# Patient Record
Sex: FEMALE | Race: WHITE | NOT HISPANIC OR LATINO
[De-identification: names, ages, dates, MRNs, and addresses within clinical notes are randomized per-mention and may not be internally consistent; named-entity substitution may affect disease eponyms.]

---

## 2019-11-05 ENCOUNTER — FORM ENCOUNTER (OUTPATIENT)
Age: 64
End: 2019-11-05

## 2020-01-21 ENCOUNTER — HOSPITAL ENCOUNTER (OUTPATIENT)
Dept: HOSPITAL 74 - JER | Age: 65
Setting detail: OBSERVATION
LOS: 1 days | Discharge: LEFT BEFORE DISCHARGE | End: 2020-01-22
Attending: FAMILY MEDICINE | Admitting: INTERNAL MEDICINE
Payer: COMMERCIAL

## 2020-01-21 VITALS — BODY MASS INDEX: 41.5 KG/M2

## 2020-01-21 DIAGNOSIS — I10: ICD-10-CM

## 2020-01-21 DIAGNOSIS — J44.9: ICD-10-CM

## 2020-01-21 DIAGNOSIS — E11.9: ICD-10-CM

## 2020-01-21 DIAGNOSIS — R55: Primary | ICD-10-CM

## 2020-01-21 DIAGNOSIS — R51: ICD-10-CM

## 2020-01-21 DIAGNOSIS — G47.33: ICD-10-CM

## 2020-01-21 LAB
ALBUMIN SERPL-MCNC: 3.8 G/DL (ref 3.4–5)
ALP SERPL-CCNC: 92 U/L (ref 45–117)
ALT SERPL-CCNC: 36 U/L (ref 13–61)
ANION GAP SERPL CALC-SCNC: 9 MMOL/L (ref 8–16)
APTT BLD: 28.3 SECONDS (ref 25.2–36.5)
AST SERPL-CCNC: 38 U/L (ref 15–37)
BASOPHILS # BLD: 0.6 % (ref 0–2)
BILIRUB SERPL-MCNC: 0.5 MG/DL (ref 0.2–1)
BUN SERPL-MCNC: 14.2 MG/DL (ref 7–18)
CALCIUM SERPL-MCNC: 9.7 MG/DL (ref 8.5–10.1)
CHLORIDE SERPL-SCNC: 101 MMOL/L (ref 98–107)
CO2 SERPL-SCNC: 26 MMOL/L (ref 21–32)
CREAT SERPL-MCNC: 0.7 MG/DL (ref 0.55–1.3)
DEPRECATED RDW RBC AUTO: 13.9 % (ref 11.6–15.6)
EOSINOPHIL # BLD: 0.4 % (ref 0–4.5)
GLUCOSE SERPL-MCNC: 193 MG/DL (ref 74–106)
HCT VFR BLD CALC: 42.6 % (ref 32.4–45.2)
HGB BLD-MCNC: 14.9 GM/DL (ref 10.7–15.3)
INR BLD: 0.93 (ref 0.83–1.09)
LYMPHOCYTES # BLD: 15.5 % (ref 8–40)
MCH RBC QN AUTO: 29.6 PG (ref 25.7–33.7)
MCHC RBC AUTO-ENTMCNC: 35.1 G/DL (ref 32–36)
MCV RBC: 84.3 FL (ref 80–96)
MONOCYTES # BLD AUTO: 5.8 % (ref 3.8–10.2)
NEUTROPHILS # BLD: 77.7 % (ref 42.8–82.8)
PLATELET # BLD AUTO: 244 K/MM3 (ref 134–434)
PMV BLD: 8.6 FL (ref 7.5–11.1)
POTASSIUM SERPLBLD-SCNC: 4.6 MMOL/L (ref 3.5–5.1)
PROT SERPL-MCNC: 7.5 G/DL (ref 6.4–8.2)
PT PNL PPP: 11 SEC (ref 9.7–13)
RBC # BLD AUTO: 5.05 M/MM3 (ref 3.6–5.2)
SODIUM SERPL-SCNC: 136 MMOL/L (ref 136–145)
WBC # BLD AUTO: 5.9 K/MM3 (ref 4–10)

## 2020-01-21 PROCEDURE — 3E033GC INTRODUCTION OF OTHER THERAPEUTIC SUBSTANCE INTO PERIPHERAL VEIN, PERCUTANEOUS APPROACH: ICD-10-PCS | Performed by: FAMILY MEDICINE

## 2020-01-21 PROCEDURE — 3E033NZ INTRODUCTION OF ANALGESICS, HYPNOTICS, SEDATIVES INTO PERIPHERAL VEIN, PERCUTANEOUS APPROACH: ICD-10-PCS | Performed by: FAMILY MEDICINE

## 2020-01-21 PROCEDURE — G0378 HOSPITAL OBSERVATION PER HR: HCPCS

## 2020-01-21 NOTE — PDOC
History of Present Illness





- General


Chief Complaint: Lightheaded


Stated Complaint: HBP/NAUSEA


Time Seen by Provider: 01/21/20 20:04


History Source: Patient


Exam Limitations: No Limitations





- History of Present Illness


Initial Comments: 





01/21/20 21:10


65 yo female pmh HTN, COPD and DM presents to the ED with SPARROW, worst of her life

, that woke her up this am with associated dizziness and N/V. Pt notes her son 

pasted away from a stroke at 34 years ol and noted bleeding problems (no known 

bleeding disorder diagnoses however, pt states if she takes ASA or NSAIDs she 

bleeds). Pt states she has had HA in the past but this one is much different 

due to persistent severity, N/V and dizziness. Pt states her BP at home was 

over 190 systolic. Denies recent illness, travel, weakness or sensory deficits 

on 1 side, confusion, CP, SOB, abdominal pain back pain, changes in bowel or 

bladder habits.





Past History





- Past Medical History


Allergies/Adverse Reactions: 


 Allergies











Allergy/AdvReac Type Severity Reaction Status Date / Time


 


pregabalin [From Lyrica] Allergy Intermediate Rash Verified 01/21/20 17:52


 


hydrochlorothiazide Allergy  Rash Verified 01/21/20 17:52


 


valsartan [From Diovan] Allergy  NEURO Verified 01/21/20 17:52





   CHANGES  











Home Medications: 


Ambulatory Orders





Losartan Potassium [Cozaar] 100 mg PO DAILY 02/24/15 


metFORMIN HCL [Glucophage] 1,000 mg PO BID 02/24/15 


Metoprolol Tartrate 75 mg PO BID 01/21/20 








COPD: No


Diabetes: Yes


HTN: Yes





- Surgical History


Abdominal Surgery: Yes (bowel obstruction)


Appendectomy: Yes





- Psycho Social/Smoking Cessation Hx


Smoking Status: No


Smoking History: Never smoked


Years of Tobacco Use: 0


Have you smoked in the past 12 months: No


Number of Cigarettes Smoked Daily: 0


Cigars Per Day: 0


Hx Alcohol Use: No


Drug/Substance Use Hx: No


Substance Use Type: None


Hx Substance Use Treatment: No





**Review of Systems





- Review of Systems


Constitutional: Yes: See HPI


HEENTM: Yes: See HPI


Respiratory: Yes: See HPI


Cardiac (ROS): Yes: See HPI


ABD/GI: Yes: See HPI


: Yes: See HPI


Musculoskeletal: Yes: See HPI


Integumentary: Yes: See HPI


Neurological: Yes: See HPI





*Physical Exam





- Vital Signs


 Last Vital Signs











Temp Pulse Resp BP Pulse Ox


 


 98 F   83   18   161/72   98 


 


 01/21/20 17:46  01/21/20 17:46  01/21/20 17:46  01/21/20 17:46  01/21/20 17:46














- Physical Exam


General Appearance: Yes: Nourished, Appropriately Dressed.  No: Apparent 

Distress


HEENT: positive: EOMI, [place], Normal Voice


Neck: positive: Supple.  negative: Carotid bruit, Rigidity, Tender midline


Respiratory/Chest: positive: Lungs Clear, Normal Breath Sounds.  negative: 

Respiratory Distress, Accessory Muscle Use, Rapid RR, Crackles, Rales, Rhonchi, 

Stridor, Wheezing


Cardiovascular: positive: Regular Rhythm, Regular Rate, S1, S2.  negative: Edema

, JVD, Murmur


Vascular Pulses: Dorsalis-Pedis (R): 3+, Doralis-Pedis (L): 3+


Gastrointestinal/Abdominal: positive: Flat, Soft.  negative: Pulsatile Mass, 

Distended, Guarding, Rebound, Tenderness


Musculoskeletal: negative: CVA Tenderness


Extremity: positive: Normal Capillary Refill, Normal Inspection, Normal Range 

of Motion


Integumentary: positive: Normal Color, Dry, Warm


Neurologic: positive: CNs II-XII NML intact, Fully Oriented, Alert, Normal Mood/

Affect, Normal Response, Motor Strength 5/5.  negative: Facial Droop, Numbness, 

Sensory Deficit, Finger to Nose (normal), Confused, Disoriented





ED Treatment Course





- LABORATORY


CBC & Chemistry Diagram: 


 01/22/20 06:15





 01/22/20 06:15





- RADIOLOGY


Radiology Studies Ordered: 














 Category Date Time Status


 


 HEAD CT WITHOUT CONTRAST [CT] Stat CT Scan  01/21/20 21:03 Ordered


 


 CHEST X-RAY PORTABLE* [RAD] Stat Radiology  01/21/20 21:02 Ordered














Medical Decision Making





- Medical Decision Making





01/21/20 21:14


65 yo female pmh HTN, COPD and DM presents to the ED with SPARROW, worst of her life

, that woke her up this am with associated dizziness and N/V. Pt notes her son 

pasted away from a stroke at 34 years ol and noted bleeding problems (no known 

bleeding disorder diagnoses however, pt states if she takes ASA or NSAIDs she 

bleeds). Pt states she has had HA in the past but this one is much different 

due to persistent severity, N/V and dizziness. Pt states her BP at home was 

over 190 systolic. Denies recent illness, travel, weakness or sensory deficits 

on 1 side, confusion, CP, SOB, abdominal pain back pain, changes in bowel or 

bladder habits.











pt hpi concerning for SAH or posterior bleed. 





Head Ct done immediately after evaluation, negative for acute bleed.





Discussed LP with pt, shared decision making, states due to bleeding hx no LP 

at this time. WIll do CTA head and reassess 





s/o to night team





Discharge





- Discharge Information


Problems reviewed: Yes


Clinical Impression/Diagnosis: 


 Lightheadedness, Headache





Condition: Guarded


Disposition: AGAINST MEDICAL ADVICE





- Follow up/Referral





- Patient Discharge Instructions





- Post Discharge Activity

## 2020-01-21 NOTE — PDOC
Attending Attestation





- Resident


Resident Name: Jude Garcia





- ED Attending Attestation


I have performed the following: I have examined & evaluated the patient, The 

case was reviewed & discussed with the resident, I agree w/resident's findings 

& plan





- HPI


HPI: 





01/22/20 00:35


see resident hpi





- Physicial Exam


PE: 





01/22/20 00:35


agree with resident exam





- Medical Decision Making





01/22/20 00:35


64-year-old female complaining of the worst headache of her life and 

lightheadedness


CT scan of the brain showed no acute bleed


Patient states she has a tendency to bleed easily, worsened when taking NSAIDs


Discussion of lumbar puncture at the bedside, patient states that she does not 

want the procedure and is aware that there is a small chance of undiagnosed 

bleeding


CTA of the brain showed no structural abnormality


We will admit to medical service for observation further evaluation and 

neurology consult

## 2020-01-22 VITALS — HEART RATE: 93 BPM | SYSTOLIC BLOOD PRESSURE: 161 MMHG | DIASTOLIC BLOOD PRESSURE: 70 MMHG

## 2020-01-22 VITALS — TEMPERATURE: 98.2 F

## 2020-01-22 LAB
ANION GAP SERPL CALC-SCNC: 7 MMOL/L (ref 8–16)
BASOPHILS # BLD: 0.7 % (ref 0–2)
BUN SERPL-MCNC: 17.4 MG/DL (ref 7–18)
CALCIUM SERPL-MCNC: 9.1 MG/DL (ref 8.5–10.1)
CHLORIDE SERPL-SCNC: 102 MMOL/L (ref 98–107)
CO2 SERPL-SCNC: 30 MMOL/L (ref 21–32)
CREAT SERPL-MCNC: 0.7 MG/DL (ref 0.55–1.3)
DEPRECATED RDW RBC AUTO: 13.5 % (ref 11.6–15.6)
EOSINOPHIL # BLD: 0.6 % (ref 0–4.5)
GLUCOSE SERPL-MCNC: 147 MG/DL (ref 74–106)
HCT VFR BLD CALC: 41 % (ref 32.4–45.2)
HGB BLD-MCNC: 14 GM/DL (ref 10.7–15.3)
LYMPHOCYTES # BLD: 30.5 % (ref 8–40)
MCH RBC QN AUTO: 29 PG (ref 25.7–33.7)
MCHC RBC AUTO-ENTMCNC: 34.2 G/DL (ref 32–36)
MCV RBC: 84.8 FL (ref 80–96)
MONOCYTES # BLD AUTO: 14 % (ref 3.8–10.2)
NEUTROPHILS # BLD: 54.2 % (ref 42.8–82.8)
PLATELET # BLD AUTO: 218 K/MM3 (ref 134–434)
PMV BLD: 8.1 FL (ref 7.5–11.1)
POTASSIUM SERPLBLD-SCNC: 3.4 MMOL/L (ref 3.5–5.1)
RBC # BLD AUTO: 4.83 M/MM3 (ref 3.6–5.2)
SODIUM SERPL-SCNC: 139 MMOL/L (ref 136–145)
WBC # BLD AUTO: 5.2 K/MM3 (ref 4–10)

## 2020-01-22 RX ADMIN — INSULIN ASPART SCH: 100 INJECTION, SOLUTION INTRAVENOUS; SUBCUTANEOUS at 22:41

## 2020-01-22 RX ADMIN — INSULIN ASPART SCH: 100 INJECTION, SOLUTION INTRAVENOUS; SUBCUTANEOUS at 18:25

## 2020-01-22 RX ADMIN — INSULIN ASPART SCH: 100 INJECTION, SOLUTION INTRAVENOUS; SUBCUTANEOUS at 11:31

## 2020-01-22 RX ADMIN — METOPROLOL TARTRATE SCH: 50 TABLET, FILM COATED ORAL at 22:41

## 2020-01-22 RX ADMIN — METOPROLOL TARTRATE SCH MG: 50 TABLET, FILM COATED ORAL at 09:33

## 2020-01-22 NOTE — CONSULT
Consult - text type





- Consultation


Consultation Note: 





 Neurology





- Primary Care Physician


PCP: Kobe Goodwin





- Admission


Chief Complaint: Headache, Dizziness


History Source: Patient


Limitations to Obtaining History: No Limitations





- History of Present Illness


65 yo female pmh HTN, COPD and DM presented to the ED with SPARROW, worst of her life

, that woke her up with associated dizziness and N/V. Pt noted her son passed 

away from a stroke at 34 years old and noted bleeding problems (no known 

bleeding disorder diagnoses however, pt stated if she takes ASA or NSAIDs she 

bleeds). Pt stated she has had HA in the past but this one was much different 

due to persistent severity, N/V and dizziness. Pt stated her BP at home was 

over 190 systolic. Denied recent illness, travel, weakness or sensory deficits 

on 1 side, confusion, CP, SOB, abdominal pain back pain, changes in bowel or 

bladder habits. Ct of head performed and demonstrated moderate volume loss 

without evidence of acute intracranial pathology. CTA of Head completed and 

awaiting official report. There was concern regarding possible subarachnoid 

hemorrhage which was the reason for having the  CTA.  However, her headaches 

significantly improved and is essentially resolved this morning.  Unlikely to 

have subarachnoid hemorrhage at this point, lumbar puncture does not seem 

necessary. Can continue evaluation for presyncopal sensations and cardiac 

workup.





- Past Medical History


Cardiovascular: Yes: HTN


Pulmonary: Yes: COPD


Gastrointestinal: Yes: Other (SBO)


Endocrine: Yes: Diabetes Mellitus





- Past Surgical History


Past Surgical History: Yes: Appendectomy, Hysterectomy





- Smoking History


Smoking history: Never smoked


Have you smoked in the past 12 months: No


Aproximately how many cigarettes per day: 0





- Alcohol/Substance Use


Hx Alcohol Use: No


History of Substance Use: reports: None





- Social History


Usual Living Arrangement: Yes: Other (family)


ADL: Independent


History of Recent Travel: No





Family Medical History


Family History: HTN





Review of Systems





- Review of Systems


Constitutional: reports: No Symptoms


Eyes: reports: No Symptoms


HENT: reports: No Symptoms


Neck: reports: No Symptoms


Cardiovascular: reports: No Symptoms


Respiratory: reports: No Symptoms


Gastrointestinal: reports: No Symptoms


Genitourinary: reports: No Symptoms


Breasts: reports: No Symptoms Reported


Musculoskeletal: reports: No Symptoms


Integumentary: reports: No Symptoms


Neurological: reports: Dizziness, Headache, Syncope (Pre)


Endocrine: reports: No Symptoms


Hematology/Lymphatic: reports: No Symptoms


Psychiatric: reports: No Symptoms


Pain Intensity: 4





Home Medications





- Allergies


Allergies/Adverse Reactions: 


 Allergies











Allergy/AdvReac Type Severity Reaction Status Date / Time


 


pregabalin [From Lyrica] Allergy Intermediate Rash Verified 01/21/20 17:52


 


hydrochlorothiazide Allergy  Rash Verified 01/21/20 17:52


 


valsartan [From Diovan] Allergy  NEURO Verified 01/21/20 17:52





   CHANGES  














- Home Medications


Home Medications: 


Ambulatory Orders





Losartan Potassium [Cozaar] 100 mg PO DAILY 02/24/15 


metFORMIN HCL [Glucophage] 1,000 mg PO BID 02/24/15 


Metoprolol Tartrate 75 mg PO BID 01/21/20 





Active Medications





Acetaminophen (Tylenol -)  650 mg PO Q6H PRN


   PRN Reason: PAIN LEVEL 6-10


Losartan Potassium (Cozaar -)  100 mg PO DAILY [facility]


Metoprolol Tartrate 50 mg/ (Metoprolol Tartrate 25 mg)  75 mg PO BID [facility]











Physical Examination


Vital Signs: 


 Vital Signs











 Period  Temp  Pulse  Resp  BP Sys/Huitron  Pulse Ox


 


 Last 24 Hr  98 F-98.8 F  69-83  15-18  129-162/63-90  95-98











Constitutional: Yes: Well Nourished, No Distress, Calm, Obese


Eyes: Yes: WNL, Conjunctiva Clear, EOM Intact, PERRL


HENT: Yes: WNL, Atraumatic, Normocephalic


Neck: Yes: WNL, Supple, Trachea Midline


Cardiovascular: Yes: WNL, Regular Rate and Rhythm, S1, S2


Respiratory: Yes: Diminished


Gastrointestinal: Yes: WNL, Normal Bowel Sounds, Soft, Abdomen, Obese


...Rectal Exam: Yes: Deferred


Renal/: Yes: WNL


Breast(s): Yes: WNL


Musculoskeletal: Yes: WNL


Extremities: Yes: WNL


Edema: Yes


Edema: LLE: Trace, RLE: Trace


Peripheral Pulses WNL: Yes


Integumentary: Yes: WNL


Neurological: Yes: WNL, Alert, Oriented, Cran Nerves II-XII Intact


...Motor Strength: WNL


Psychiatric: Yes: WNL, Alert, Oriented





Labs: 


 CBCD











WBC  5.2 K/mm3 (4.0-10.0)   01/22/20  06:15    


 


RBC  4.83 M/mm3 (3.60-5.2)   01/22/20  06:15    


 


Hgb  14.0 GM/dL (10.7-15.3)   01/22/20  06:15    


 


Hct  41.0 % (32.4-45.2)   01/22/20  06:15    


 


MCV  84.8 fl (80-96)   01/22/20  06:15    


 


MCHC  34.2 g/dl (32.0-36.0)   01/22/20  06:15    


 


RDW  13.5 % (11.6-15.6)   01/22/20  06:15    


 


Plt Count  218 K/MM3 (134-434)   01/22/20  06:15    


 


MPV  8.1 fl (7.5-11.1)   01/22/20  06:15    








 CMP











Sodium  139 mmol/L (136-145)   01/22/20  06:15    


 


Potassium  3.4 mmol/L (3.5-5.1)  L  01/22/20  06:15    


 


Chloride  102 mmol/L ()   01/22/20  06:15    


 


Carbon Dioxide  30 mmol/L (21-32)   01/22/20  06:15    


 


Anion Gap  7 MMOL/L (8-16)  L  01/22/20  06:15    


 


BUN  17.4 mg/dL (7-18)   01/22/20  06:15    


 


Creatinine  0.7 mg/dL (0.55-1.3)   01/22/20  06:15    


 


Random Glucose  147 mg/dL ()  H  01/22/20  06:15    


 


Calcium  9.1 mg/dL (8.5-10.1)   01/22/20  06:15    


 


Total Bilirubin  0.5 mg/dL (0.2-1)   01/21/20  20:20    


 


AST  38 U/L (15-37)  H  01/21/20  20:20    


 


ALT  36 U/L (13-61)   01/21/20  20:20    


 


Alkaline Phosphatase  92 U/L ()   01/21/20  20:20    


 


Total Protein  7.5 g/dl (6.4-8.2)   01/21/20  20:20    


 


Albumin  3.8 g/dl (3.4-5.0)   01/21/20  20:20    








 CARDIAC ENZYMES











Creatine Kinase  121 U/L ()   01/21/20  20:20    


 


Troponin I  < 0.02 ng/ml (0.00-0.05)   01/21/20  20:20    











Assessment/Plan


65 yo female pmh HTN, COPD and DM presented to the ED with SPARROW, worst of her life

, that woke her up with associated dizziness and N/V. Pt noted her son passed 

away from a stroke at 34 years old and noted bleeding problems (no known 

bleeding disorder diagnoses however, pt stated if she takes ASA or NSAIDs she 

bleeds). Pt stated she has had HA in the past but this one was much different 

due to persistent severity, N/V and dizziness. Pt stated her BP at home was 

over 190 systolic. Denied recent illness, travel, weakness or sensory deficits 

on 1 side, confusion, CP, SOB, abdominal pain back pain, changes in bowel or 

bladder habits. Ct of head performed and demonstrated moderate volume loss 

without evidence of acute intracranial pathology. CTA of Head completed and 

awaiting official report. There was concern regarding possible subarachnoid 

hemorrhage which was the reason for having the  CTA.  However, her headaches 

significantly improved and is essentially resolved this morning.  Unlikely to 

have subarachnoid hemorrhage at this point, lumbar puncture does not seem 

necessary. Can continue evaluation for presyncopal sensations and cardiac 

workup. Monitor blood pressure, maintain less than 140/90. Monitor glucose, 

maintain euglycemic range.  Recommend continued hydration and  cognitive rest. 

  Follow-up CTA results/report. Can take Tylenol or Fioricet for headache if 

needed. Not more than 3X per day or 3 days per week, which can precipitate 

rebound and medication overuse headaches.

## 2020-01-22 NOTE — HP
Admitting History and Physical





- Primary Care Physician


PCP: Kobe Goodwin





- Admission


Chief Complaint: Headache, Dizziness


History of Present Illness: 





This is a 63 y/o woman with a PMHx of HTN, DM, COPD, MATA. Who presents to the 

ED with dizziness and headache x today. Patient describes the headache as sharp/

severe with nausea. Patient reports increased dizziness with movement. Patient 

denies recent fall or trauma. Patient denies fever, chills, SOB, CP, 

palpitations, AP, V/D, constipation, dysuria. Patient reports familial hx CVA.





ED Course was noted for:





(1) Head CT- moderate volume/loss, no evidence of acute intracranial pathology. 

moderate deviation of nasal septum towards the right


(2) Head CTA- widely patent and normal caliber arteries in the anterior and 

posterior circulation with no evidence of focal aneursym


(3) EKG- NSR RBBB, T wave abnormality








History Source: Patient


Limitations to Obtaining History: No Limitations





- Past Medical History


Cardiovascular: Yes: HTN


Pulmonary: Yes: COPD


Gastrointestinal: Yes: Other (SBO)


Endocrine: Yes: Diabetes Mellitus





- Past Surgical History


Past Surgical History: Yes: Appendectomy, Hysterectomy





- Smoking History


Smoking history: Never smoked


Have you smoked in the past 12 months: No


Aproximately how many cigarettes per day: 0





- Alcohol/Substance Use


Hx Alcohol Use: No


History of Substance Use: reports: None





- Social History


Usual Living Arrangement: Yes: Other (family)


ADL: Independent


History of Recent Travel: No





Home Medications





- Allergies


Allergies/Adverse Reactions: 


 Allergies











Allergy/AdvReac Type Severity Reaction Status Date / Time


 


pregabalin [From Lyrica] Allergy Intermediate Rash Verified 20 17:52


 


hydrochlorothiazide Allergy  Rash Verified 20 17:52


 


valsartan [From Diovan] Allergy  NEURO Verified 20 17:52





   CHANGES  














- Home Medications


Home Medications: 


Ambulatory Orders





Losartan Potassium [Cozaar] 100 mg PO DAILY 02/24/15 


metFORMIN HCL [Glucophage] 1,000 mg PO BID 02/24/15 


Metoprolol Tartrate 75 mg PO BID 20 











Family Medical History


Family Hx Nuerologic Problems: Son (CVA-  age in 30's)





Review of Systems





- Review of Systems


Constitutional: reports: No Symptoms


Eyes: reports: No Symptoms


HENT: reports: No Symptoms


Neck: reports: No Symptoms


Cardiovascular: reports: No Symptoms


Respiratory: reports: No Symptoms


Gastrointestinal: reports: Nausea


Genitourinary: reports: No Symptoms


Breasts: reports: No Symptoms Reported


Musculoskeletal: reports: No Symptoms


Integumentary: reports: No Symptoms


Neurological: reports: Dizziness, Headache, Syncope (Pre)


Endocrine: reports: No Symptoms


Hematology/Lymphatic: reports: No Symptoms


Psychiatric: reports: No Symptoms


Pain Intensity: 4





Physical Examination


Vital Signs: 


 Vital Signs











Temperature  98.2 F   20 21:52


 


Pulse Rate  81   20 21:52


 


Respiratory Rate  15   20 21:52


 


Blood Pressure  162/90   20 21:52


 


O2 Sat by Pulse Oximetry (%)  97   20 21:52











Constitutional: Yes: Well Nourished, No Distress, Calm, Obese


Eyes: Yes: WNL, Conjunctiva Clear, EOM Intact, PERRL


HENT: Yes: WNL, Atraumatic, Normocephalic


Neck: Yes: WNL, Supple, Trachea Midline


Cardiovascular: Yes: WNL, Regular Rate and Rhythm, S1, S2


Respiratory: Yes: Diminished


Gastrointestinal: Yes: WNL, Normal Bowel Sounds, Soft, Abdomen, Obese


...Rectal Exam: Yes: Deferred


Renal/: Yes: WNL


Breast(s): Yes: WNL


Musculoskeletal: Yes: WNL


Extremities: Yes: WNL


Edema: Yes


Edema: LLE: Trace, RLE: Trace


Peripheral Pulses WNL: Yes


Integumentary: Yes: WNL


Neurological: Yes: WNL, Alert, Oriented, Cran Nerves II-XII Intact


...Motor Strength: WNL


Psychiatric: Yes: WNL, Alert, Oriented


Labs: 


 CBC, BMP





 20 20:20 





 20 20:20 





 Laboratory Results - last 24 hr











  20





  20:20 20:20 20:20


 


WBC    5.9


 


RBC    5.05


 


Hgb    14.9


 


Hct    42.6


 


MCV    84.3


 


MCH    29.6


 


MCHC    35.1


 


RDW    13.9


 


Plt Count    244


 


MPV    8.6


 


Absolute Neuts (auto)    4.6


 


Neutrophils %    77.7  D


 


Lymphocytes %    15.5  D


 


Monocytes %    5.8


 


Eosinophils %    0.4


 


Basophils %    0.6


 


Nucleated RBC %    0


 


PT with INR  11.00  


 


INR  0.93  


 


PTT (Actin FS)  28.3  


 


Sodium   


 


Potassium   


 


Chloride   


 


Carbon Dioxide   


 


Anion Gap   


 


BUN   


 


Creatinine   


 


Est GFR (CKD-EPI)AfAm   


 


Est GFR (CKD-EPI)NonAf   


 


Random Glucose   


 


Calcium   


 


Total Bilirubin   


 


AST   


 


ALT   


 


Alkaline Phosphatase   


 


Creatine Kinase   121 


 


Troponin I   < 0.02 


 


Total Protein   


 


Albumin   














  20





  20:20


 


WBC 


 


RBC 


 


Hgb 


 


Hct 


 


MCV 


 


MCH 


 


MCHC 


 


RDW 


 


Plt Count 


 


MPV 


 


Absolute Neuts (auto) 


 


Neutrophils % 


 


Lymphocytes % 


 


Monocytes % 


 


Eosinophils % 


 


Basophils % 


 


Nucleated RBC % 


 


PT with INR 


 


INR 


 


PTT (Actin FS) 


 


Sodium  136


 


Potassium  4.6


 


Chloride  101


 


Carbon Dioxide  26


 


Anion Gap  9


 


BUN  14.2


 


Creatinine  0.7


 


Est GFR (CKD-EPI)AfAm  106.87


 


Est GFR (CKD-EPI)NonAf  92.21


 


Random Glucose  193 H


 


Calcium  9.7


 


Total Bilirubin  0.5


 


AST  38 H


 


ALT  36


 


Alkaline Phosphatase  92


 


Creatine Kinase 


 


Troponin I 


 


Total Protein  7.5


 


Albumin  3.8








 Intake & Output











 20





 23:59 23:59 23:59 23:59


 


Weight   113.398 kg 








 Current Medications











Generic Name Dose Route Start Last Admin





  Trade Name Freq  PRN Reason Stop Dose Admin


 


Losartan Potassium  100 mg  20 10:00  





  Cozaar -  PO   





  DAILY RAJ   





     





     





     





     


 


Metoprolol Tartrate  75 mg  20 10:00  





  Lopressor -  PO   





  BID RAJ   





     





     





     





     














Imaging





- Results


Chest X-ray: Report Reviewed, Image Reviewed


Cat Scan: Report Reviewed, Image Reviewed


EKG: Image Reviewed





Problem List





- Problems


(1) Pre-syncope


Assessment/Plan: 


r/o Stroke vs Arrhythmia


Continue cardiac monitoring


Head CT- moderate volume/loss, no evidence of acute intracranial pathology


Head CTA- widely patent and normal caliber arteries in the anterior and 

posterior circulation with no evidence of focal aneursym


Appreciate Neurology consult


Neurochecks


Orthostatics


Fall Precautions


Monitor CBC, BMP





Code(s): R55 - SYNCOPE AND COLLAPSE   





(2) Headache


Assessment/Plan: 


See above


Tylenol prn





Code(s): R51 - HEADACHE   





(3) COPD (chronic obstructive pulmonary disease)


Assessment/Plan: 


stable


No acute flair


Duonebs prn


O2, prn








Code(s): J44.9 - CHRONIC OBSTRUCTIVE PULMONARY DISEASE, UNSPECIFIED   





(4) HTN (hypertension)


Assessment/Plan: 


stable


Monitor BP


Continue home meds


Monitor renal function


Code(s): I10 - ESSENTIAL (PRIMARY) HYPERTENSION   





(5) Diabetes mellitus


Assessment/Plan: 


stable


BGMs


Hold Metformin secondary to CTA


ISS


Code(s): E11.9 - TYPE 2 DIABETES MELLITUS WITHOUT COMPLICATIONS   





Assessment/Plan





This is a 63 y/o woman with a PMHx of HTN, DM, COPD, MATA (on CPAP). Placed in 

Telemetry Observation for Presyncope, Headache for further evaluation of their 

emergent condition.





Plan:


See Problem List





FEN


PO fluids as tolerated


Replete lytes prn


Low Na, Diabetic Diet





DVT ppx


OOB


SCDs


Consider AC if LOS> 48hrs





Dispo: Observation





Visit type





- Emergency Visit


Emergency Visit: Yes


ED Registration Date: 20


Care time: The patient presented to the Emergency Department on the above date 

and was hospitalized for further evaluation of their emergent condition.





- New Patient


This patient is new to me today: Yes


Date on this admission: 20





- Critical Care


Critical Care patient: No

## 2020-01-22 NOTE — CON.PULM
Consult


Consult Specialty:: PULMONARY


Referred by:: Dr Ogden


Reason for Consultation:: COPD





- History of Present Illness


Chief Complaint: headache


History of Present Illness: 





63yo female with h/o HTN, DM, COPD who was admitted with dizziness and 

headache. Found to have elevated blood pressures. Denies shortness of breath or 

chest pain. No fevers, chills or sweats. No recent sicknesses. Imaging 

unremarkable and blood pressures have improved. She reports feeling much 

improved, back to baseline.








- History Source


History Provided By: Patient, Medical Record


Limitations to Obtaining History: No Limitations





- Past Medical History


Cardio/Vascular: Yes: HTN


Pulmonary: Yes: COPD


Gastrointestinal: Yes: Other (SBO)


Endocrine: Yes: Diabetes Mellitus





- Past Surgical History


Past Surgical History: Yes: Appendectomy, Hysterectomy





- Alcohol/Substance Use


Hx Alcohol Use: No


History of Substance Use: reports: None





- Smoking History


Smoking history: Never smoked


Have you smoked in the past 12 months: No


Aproximately how many cigarettes per day: 0





- Social History


ADL: Independent


History of Recent Travel: No





Home Medications





- Allergies


Allergies/Adverse Reactions: 


 Allergies











Allergy/AdvReac Type Severity Reaction Status Date / Time


 


pregabalin [From Lyrica] Allergy Intermediate Rash Verified 01/21/20 17:52


 


hydrochlorothiazide Allergy  Rash Verified 01/21/20 17:52


 


valsartan [From Diovan] Allergy  NEURO Verified 01/21/20 17:52





   CHANGES  














- Home Medications


Home Medications: 


Ambulatory Orders





Losartan Potassium [Cozaar] 100 mg PO DAILY 02/24/15 


metFORMIN HCL [Glucophage] 1,000 mg PO BID 02/24/15 


Metoprolol Tartrate 75 mg PO BID 01/21/20 











Review of Systems





- Review of Systems


Constitutional: denies: Chills, Fever


Eyes: denies: Recent Change in Vision


HENT: denies: Nasal Congestion, Throat Pain


Neck: denies: Stiffness, Tenderness


Cardiovascular: denies: Chest Pain, Shortness of Breath


Respiratory: denies: Cough, Wheezing


Gastrointestinal: reports: Nausea, Vomiting


Genitourinary: denies: Dysuria, Hematuria


Neurological: reports: Dizziness, Headache


Endocrine: denies: Unexplained Weight Loss





Physical Exam


Vital Sings: 


 Vital Signs











Temperature  98.2 F   01/22/20 10:47


 


Pulse Rate  71   01/22/20 10:47


 


Respiratory Rate  16   01/22/20 10:47


 


Blood Pressure  147/73   01/22/20 10:47


 


O2 Sat by Pulse Oximetry (%)  100   01/22/20 10:47











Constitutional: Yes: Calm


Eyes: Yes: Conjunctiva Clear, EOM Intact


HENT: Yes: Atraumatic, Normocephalic


Neck: Yes: Supple, Trachea Midline


Cardiovascular: Yes: Regular Rate and Rhythm


Respiratory: Yes: Diminished (decreased breath sounds at the bases)


...Clubbing: No


Gastrointestinal: Yes: Normal Bowel Sounds, Soft.  No: Tenderness


Edema: No


Neurological: Yes: Alert, Oriented


Labs: 


 CBC, BMP





 01/22/20 06:15 





 01/22/20 06:15 











Imaging





- Results


Chest X-ray: Report Reviewed, Image Reviewed (cardiomegaly, no infiltrates)





Problem List





- Problems


(1) Headache


Code(s): R51 - HEADACHE   





Assessment/Plan





Headache improved


HTN


DM


COPD





-  inhaled bronchodilators as needed


-  BP control


-  neuro work up 


-  DVT prophylaxis





Thank you for this consult


Sonny Shah MD

## 2020-01-22 NOTE — PN
Progress Note, Physician


Chief Complaint: 





Presyncope


Headache


History of Present Illness: 





Previous notes and events reviewed


awake and alert


NAD


denies complaints of headache, visual disturbance, or dizziness


no reports of nausea or vomiting





- Current Medication List


Current Medications: 


Active Medications





Acetaminophen (Tylenol -)  650 mg PO Q6H PRN


   PRN Reason: PAIN LEVEL 6-10


Losartan Potassium (Cozaar -)  100 mg PO DAILY [facility]


   Last Admin: 01/22/20 09:33 Dose:  100 mg


Metoprolol Tartrate 50 mg/ (Metoprolol Tartrate 25 mg)  75 mg PO BID [facility]


   Last Admin: 01/22/20 09:33 Dose:  75 mg











- Objective


Vital Signs: 


 Vital Signs











Temperature  98.8 F   01/22/20 06:00


 


Pulse Rate  79   01/22/20 06:00


 


Respiratory Rate  16   01/22/20 06:00


 


Blood Pressure  154/84   01/22/20 06:00


 


O2 Sat by Pulse Oximetry (%)  95   01/22/20 09:03











Constitutional: Yes: No Distress, Calm, Obese


Eyes: Yes: Conjunctiva Clear


HENT: Yes: Atraumatic


Cardiovascular: Yes: Regular Rate and Rhythm


Respiratory: Yes: Regular, CTA Bilaterally


Gastrointestinal: Yes: Normal Bowel Sounds, Soft, Abdomen, Obese


Musculoskeletal: Yes: Muscle Weakness


Extremities: Yes: WNL


Edema: No


Neurological: Yes: Alert, Oriented, Other (no facial droop, no slurred speech, 

equal bilateral strength )


Psychiatric: Yes: Alert, Oriented


Labs: 


 CBC, BMP





 01/22/20 06:15 





 01/22/20 06:15 





 INR, PTT











INR  0.93  (0.83-1.09)   01/21/20  20:20    














Problem List





- Problems


(1) Pre-syncope


Assessment/Plan: 


-Neurology on board


-Head CT scan shows moderate volume loss without CT evidence of acute 

intracranial pathology, moderate deviation of the nasal septum towards the right


-Head CTA official result pending


-Neuro checks q4h


-Carotid US 


-troponin neg x 1


-tele monitoring 


-fall precaution


-lipid panel


-Cardiology consult


Code(s): R55 - SYNCOPE AND COLLAPSE   





(2) Headache


Assessment/Plan: 


-Neurology on board


-Head CT scan shows moderate volume loss without CT evidence of acute 

intracranial pathology, moderate deviation of the nasal septum towards the right


-Head CTA official result pending


-Neuro checks q4h


-Carotid US 


-pain control


Code(s): R51 - HEADACHE   





(3) HTN (hypertension)


Assessment/Plan: 


-Losartan, Metoprolol


-low Na diet


Code(s): I10 - ESSENTIAL (PRIMARY) HYPERTENSION   





(4) Diabetes mellitus


Assessment/Plan: 


-Metformin


-BGM ACHS


-ISS





Code(s): E11.9 - TYPE 2 DIABETES MELLITUS WITHOUT COMPLICATIONS   





(5) COPD (chronic obstructive pulmonary disease)


Assessment/Plan: 


-Pulmonary consult


-keep SpO2 >90%


-O2 via NC


Code(s): J44.9 - CHRONIC OBSTRUCTIVE PULMONARY DISEASE, UNSPECIFIED   





Assessment/Plan





see problem list

## 2020-01-22 NOTE — EKG
Test Reason : 

Blood Pressure : ***/*** mmHG

Vent. Rate : 077 BPM     Atrial Rate : 077 BPM

   P-R Int : 150 ms          QRS Dur : 152 ms

    QT Int : 454 ms       P-R-T Axes : 059 039 007 degrees

   QTc Int : 513 ms

 

*** POOR DATA QUALITY, INTERPRETATION MAY BE ADVERSELY AFFECTED

NORMAL SINUS RHYTHM

POSSIBLE LEFT ATRIAL ENLARGEMENT

RIGHT BUNDLE BRANCH BLOCK

T WAVE ABNORMALITY, CONSIDER LATERAL ISCHEMIA

ABNORMAL ECG

WHEN COMPARED WITH ECG OF 30-NOV-2013 17:23,

NO SIGNIFICANT CHANGE WAS FOUND

Confirmed by KAYLAN AMES MD ([id number]) on 1/22/2020 10:35:47 AM

 

Referred By:             Confirmed By:KAYLAN AMES MD

## 2020-01-22 NOTE — HOSP
Subjective





- Review of Symptoms


Events since last encounter: 





Hospitalist Encounter


Notified by the evening nursing supervisor and primary RN, that the patient was 

told she was being discharged earlier today by the primary NP.


After review of the notes, no discharge indicated. Call placed to Christina Carney 

NP, left voice message with my cell number.


No response


Was called again by the nursing supervisor patient is now adamant on leaving AMA


I went down to the ED spoke with the patient, who is refusing further care. 

Attempts made to have the patient reconsider- unsuccessful


Risks and dangers discussed with patient, who verbalized understanding.


Patient signed AMA


Patient left the ED with her cane, with the evening nursing supervisor.








Physical Examination


Vital Signs: 


 Vital Signs











Temperature  98.2 F   01/22/20 18:00


 


Pulse Rate  93 H  01/22/20 22:33


 


Respiratory Rate  18   01/22/20 22:33


 


Blood Pressure  161/70   01/22/20 22:33


 


O2 Sat by Pulse Oximetry (%)  96   01/22/20 22:33











Labs: 


 CBC, BMP





 01/22/20 06:15 





 01/22/20 06:15

## 2020-01-23 NOTE — PN
Progress Note (short form)





- Note


Progress Note: 








 Neurology





- Primary Care Physician


PCP: Kobe Goodwin





- Admission


Chief Complaint: Headache, Dizziness


History Source: Patient


Limitations to Obtaining History: No Limitations





- History of Present Illness


65 yo female pmh HTN, COPD and DM presented to the ED with SPARROW, worst of her life

, that woke her up with associated dizziness and N/V. Pt noted her son passed 

away from a stroke at 34 years old and noted bleeding problems (no known 

bleeding disorder diagnoses however, pt stated if she takes ASA or NSAIDs she 

bleeds). Pt stated she has had HA in the past but this one was much different 

due to persistent severity, N/V and dizziness. Pt stated her BP at home was 

over 190 systolic. Denied recent illness, travel, weakness or sensory deficits 

on 1 side, confusion, CP, SOB, abdominal pain back pain, changes in bowel or 

bladder habits. Ct of head performed and demonstrated moderate volume loss 

without evidence of acute intracranial pathology. CTA of Head completed and 

awaiting official report. There was concern regarding possible subarachnoid 

hemorrhage which was the reason for having the  CTA.  However, her headaches 

significantly improved and is essentially resolved this morning.  Unlikely to 

have subarachnoid hemorrhage at this point, lumbar puncture does not seem 

necessary. Can continue evaluation for presyncopal sensations and cardiac 

workup.





Active Medications





Acetaminophen (Tylenol -)  650 mg PO Q6H PRN


   PRN Reason: PAIN LEVEL 6-10


Insulin Aspart (Novolog Vial Sliding Scale -)  1 vial SQ ACHS [facility]; Protocol


   Last Admin: 01/22/20 22:41 Dose:  Not Given


Losartan Potassium (Cozaar -)  100 mg PO DAILY [facility]


   Last Admin: 01/22/20 09:33 Dose:  100 mg


Metformin HCl (Glucophage -)  1,000 mg PO BIDAC [facility]


   Last Admin: 01/22/20 18:25 Dose:  Not Given


Metoprolol Tartrate 50 mg/ (Metoprolol Tartrate 25 mg)  75 mg PO BID [facility]


   Last Admin: 01/22/20 22:41 Dose:  Not Given








Physical Examination


Vital Signs: 


 Vital Signs











 Period  Temp  Pulse  Resp  BP Sys/Huitron  Pulse Ox


 


 Last 24 Hr  98.2 F-98.2 F  71-93  16-18  140-161/70-73  











Constitutional: Yes: Well Nourished, No Distress, Calm, Obese


Eyes: Yes: WNL, Conjunctiva Clear, EOM Intact, PERRL


HENT: Yes: WNL, Atraumatic, Normocephalic


Neck: Yes: WNL, Supple, Trachea Midline


Cardiovascular: Yes: WNL, Regular Rate and Rhythm, S1, S2


Respiratory: Yes: Diminished


Gastrointestinal: Yes: WNL, Normal Bowel Sounds, Soft, Abdomen, Obese


...Rectal Exam: Yes: Deferred


Renal/: Yes: WNL


Breast(s): Yes: WNL


Musculoskeletal: Yes: WNL


Extremities: Yes: WNL


Edema: Yes


Edema: LLE: Trace, RLE: Trace


Peripheral Pulses WNL: Yes


Integumentary: Yes: WNL


Neurological: Yes: WNL, Alert, Oriented, Cran Nerves II-XII Intact


...Motor Strength: WNL


Psychiatric: Yes: WNL, Alert, Oriented





Labs: 


 CBCD











WBC  5.2 K/mm3 (4.0-10.0)   01/22/20  06:15    


 


RBC  4.83 M/mm3 (3.60-5.2)   01/22/20  06:15    


 


Hgb  14.0 GM/dL (10.7-15.3)   01/22/20  06:15    


 


Hct  41.0 % (32.4-45.2)   01/22/20  06:15    


 


MCV  84.8 fl (80-96)   01/22/20  06:15    


 


MCHC  34.2 g/dl (32.0-36.0)   01/22/20  06:15    


 


RDW  13.5 % (11.6-15.6)   01/22/20  06:15    


 


Plt Count  218 K/MM3 (134-434)   01/22/20  06:15    


 


MPV  8.1 fl (7.5-11.1)   01/22/20  06:15    








 CMP











Sodium  139 mmol/L (136-145)   01/22/20  06:15    


 


Potassium  3.4 mmol/L (3.5-5.1)  L  01/22/20  06:15    


 


Chloride  102 mmol/L ()   01/22/20  06:15    


 


Carbon Dioxide  30 mmol/L (21-32)   01/22/20  06:15    


 


Anion Gap  7 MMOL/L (8-16)  L  01/22/20  06:15    


 


BUN  17.4 mg/dL (7-18)   01/22/20  06:15    


 


Creatinine  0.7 mg/dL (0.55-1.3)   01/22/20  06:15    


 


Random Glucose  147 mg/dL ()  H  01/22/20  06:15    


 


Calcium  9.1 mg/dL (8.5-10.1)   01/22/20  06:15    


 


Total Bilirubin  0.5 mg/dL (0.2-1)   01/21/20  20:20    


 


AST  38 U/L (15-37)  H  01/21/20  20:20    


 


ALT  36 U/L (13-61)   01/21/20  20:20    


 


Alkaline Phosphatase  92 U/L ()   01/21/20  20:20    


 


Total Protein  7.5 g/dl (6.4-8.2)   01/21/20  20:20    


 


Albumin  3.8 g/dl (3.4-5.0)   01/21/20  20:20    








 CARDIAC ENZYMES











Creatine Kinase  121 U/L ()   01/21/20  20:20    


 


Troponin I  < 0.02 ng/ml (0.00-0.05)   01/21/20  20:20    











Assessment/Plan


65 yo female pmh HTN, COPD and DM presented to the ED with SPARROW, worst of her life

, that woke her up with associated dizziness and N/V. Pt noted her son passed 

away from a stroke at 34 years old and noted bleeding problems (no known 

bleeding disorder diagnoses however, pt stated if she takes ASA or NSAIDs she 

bleeds). Pt stated she has had HA in the past but this one was much different 

due to persistent severity, N/V and dizziness. Pt stated her BP at home was 

over 190 systolic. Denied recent illness, travel, weakness or sensory deficits 

on 1 side, confusion, CP, SOB, abdominal pain back pain, changes in bowel or 

bladder habits. Ct of head performed and demonstrated moderate volume loss 

without evidence of acute intracranial pathology. CTA of Head completed and 

awaiting official report. There was concern regarding possible subarachnoid 

hemorrhage which was the reason for having the  CTA.  However, her headaches 

significantly improved and is essentially resolved this morning.  Unlikely to 

have subarachnoid hemorrhage at this point, lumbar puncture does not seem 

necessary. Can continue evaluation for presyncopal sensations and cardiac 

workup. Monitor blood pressure, maintain less than 140/90. Monitor glucose, 

maintain euglycemic range.  Recommend continued hydration and  cognitive rest. 

  Follow-up CTA results/report. Can take Tylenol or Fioricet for headache if 

needed. Not more than 3X per day or 3 days per week, which can precipitate 

rebound and medication overuse headaches.

## 2020-11-10 ENCOUNTER — FORM ENCOUNTER (OUTPATIENT)
Age: 65
End: 2020-11-10

## 2021-11-08 PROBLEM — Z00.00 ENCOUNTER FOR PREVENTIVE HEALTH EXAMINATION: Status: ACTIVE | Noted: 2021-11-08

## 2021-11-15 DIAGNOSIS — Z86.39 PERSONAL HISTORY OF OTHER ENDOCRINE, NUTRITIONAL AND METABOLIC DISEASE: ICD-10-CM

## 2021-11-15 DIAGNOSIS — Z86.79 PERSONAL HISTORY OF OTHER DISEASES OF THE CIRCULATORY SYSTEM: ICD-10-CM

## 2021-11-15 DIAGNOSIS — Z78.9 OTHER SPECIFIED HEALTH STATUS: ICD-10-CM

## 2021-11-15 DIAGNOSIS — Z80.8 FAMILY HISTORY OF MALIGNANT NEOPLASM OF OTHER ORGANS OR SYSTEMS: ICD-10-CM

## 2021-11-15 DIAGNOSIS — Z80.49 FAMILY HISTORY OF MALIGNANT NEOPLASM OF OTHER GENITAL ORGANS: ICD-10-CM

## 2021-11-15 DIAGNOSIS — Z80.0 FAMILY HISTORY OF MALIGNANT NEOPLASM OF DIGESTIVE ORGANS: ICD-10-CM

## 2021-11-15 RX ORDER — LOSARTAN POTASSIUM 50 MG/1
50 TABLET, FILM COATED ORAL
Refills: 0 | Status: ACTIVE | COMMUNITY

## 2021-11-15 RX ORDER — METFORMIN HYDROCHLORIDE 625 MG/1
TABLET ORAL
Refills: 0 | Status: ACTIVE | COMMUNITY

## 2021-11-16 ENCOUNTER — APPOINTMENT (OUTPATIENT)
Dept: BREAST CENTER | Facility: CLINIC | Age: 66
End: 2021-11-16
Payer: MEDICARE

## 2021-11-16 VITALS — WEIGHT: 26 LBS | HEIGHT: 64 IN | BODY MASS INDEX: 4.44 KG/M2

## 2021-11-16 DIAGNOSIS — Z87.09 PERSONAL HISTORY OF OTHER DISEASES OF THE RESPIRATORY SYSTEM: ICD-10-CM

## 2021-11-16 PROCEDURE — 99213 OFFICE O/P EST LOW 20 MIN: CPT

## 2021-11-16 RX ORDER — METOPROLOL TARTRATE 100 MG/1
100 TABLET, FILM COATED ORAL
Refills: 0 | Status: ACTIVE | COMMUNITY

## 2021-11-16 NOTE — PAST MEDICAL HISTORY
[Postmenopausal] : The patient is postmenopausal [Menopause Age____] : age at menopause was [unfilled] [Live Births ___] : P[unfilled]  [Age At Live Birth ___] : Age at live birth: [unfilled] [Menarche Age ____] : age at menarche was [unfilled] [Total Preg ___] : G[unfilled] [History of Hormone Replacement Treatment] : has no history of hormone replacement treatment [de-identified] : SANYA in 2006 due to menorrhagia

## 2021-11-16 NOTE — PHYSICAL EXAM
[Normocephalic] : normocephalic [Atraumatic] : atraumatic [EOMI] : extra ocular movement intact [Supple] : supple [No Supraclavicular Adenopathy] : no supraclavicular adenopathy [No Cervical Adenopathy] : no cervical adenopathy [Examined in the supine and seated position] : examined in the supine and seated position [No dominant masses] : no dominant masses in right breast  [No dominant masses] : no dominant masses left breast [No Nipple Retraction] : no left nipple retraction [No Nipple Discharge] : no left nipple discharge [Breast Mass Right Breast ___cm] : no masses [Breast Mass Left Breast ___cm] : no masses [No Axillary Lymphadenopathy] : no left axillary lymphadenopathy [No Edema] : no edema [No Rashes] : no rashes [No Ulceration] : no ulceration [Breast Nipple Inversion] : nipples not inverted [Breast Nipple Retraction] : nipples not retracted [Breast Nipple Flattening] : nipples not flattened [Breast Nipple Fissures] : nipples not fissured [Breast Abnormal Lactation (Galactorrhea)] : no galactorrhea [Breast Abnormal Secretion Bloody Fluid] : no bloody discharge [Breast Abnormal Secretion Serous Fluid] : no serous discharge [Breast Abnormal Secretion Opalescent Fluid] : no milky discharge [de-identified] : On exam, the patient has pendulous ptotic triple D-cup breasts.  On palpation, I cannot feel any suspicious densities in either breast.  She has significant obesity with back issues.  She has no axillary, supraclavicular, or cervical adenopathy.

## 2021-11-16 NOTE — REASON FOR VISIT
[Follow-Up: _____] : a [unfilled] follow-up visit [FreeTextEntry1] : The patient comes in for routine yearly follow-up with a history of an intraductal papilloma completely excised from her right breast retroareolar region due to yellow discharge.  She continues to get yearly mammography and ultrasound.

## 2021-11-16 NOTE — ASSESSMENT
[FreeTextEntry1] : The patient is a 66-year-old G1, P1 postmenopausal white female [place] descent.  She underwent menarche at age 12 and had her first child at age 21.  She underwent menopause after a total abdominal hysterectomy performed in 2006 due to menorrhagia at age 51 and did take estrogen replacement for about 1 month.  She has a history of some milky yellow discharge from the right breast and underwent an ultrasound showing a questionable retroareolar density and surgical excision was performed on March 5, 2015 at [facility] by Dr. López which showed an intraductal papilloma which was completely excised.  The patient underwent her last bilateral mammography and ultrasound which was reviewed from November 12, 2021 at [facility] and there is some slight asymmetry in the left breast but I am awaiting the official radiologist report.  On exam today, I cannot feel any suspicious densities in either breast. The patient was reassured and should follow-up again in 1 year in November 2022.  She will be due for her next bilateral mammography and ultrasound in November 2022 as long as her recent films are read as benign and she was given prescriptions.

## 2021-11-16 NOTE — REVIEW OF SYSTEMS
[Arthralgias] : arthralgias [Eyesight Problems] : eyesight problems [Nosebleeds] : nosebleeds [Hoarseness] : hoarseness [Shortness Of Breath] : shortness of breath [Wheezing] : wheezing [Sleep Disturbances] : sleep disturbances [Easy Bruising] : a tendency for easy bruising [Negative] : Heme/Lymph

## 2021-11-16 NOTE — HISTORY OF PRESENT ILLNESS
[FreeTextEntry1] : The patient is a 66-year-old G1, P1 postmenopausal white female [place] descent.  She underwent menarche at age 12 and had her first child at age 21.  She underwent menopause after a total abdominal hysterectomy performed in 2006 due to menorrhagia at age 51 and did take estrogen replacement for about 1 month.  She has a history of some milky yellow discharge from the right breast and underwent an ultrasound showing a questionable retroareolar density and surgical excision was performed on March 5, 2015 at [facility] by Dr. López which showed an intraductal papilloma which was completely excised.  She comes in now for routine yearly breast exam and continues to get yearly mammography and ultrasound.

## 2021-11-24 ENCOUNTER — NON-APPOINTMENT (OUTPATIENT)
Age: 66
End: 2021-11-24

## 2022-11-21 ENCOUNTER — RESULT REVIEW (OUTPATIENT)
Age: 67
End: 2022-11-21

## 2022-11-25 NOTE — REVIEW OF SYSTEMS
[Eyesight Problems] : eyesight problems [Nosebleeds] : nosebleeds [Hoarseness] : hoarseness [Shortness Of Breath] : shortness of breath [Wheezing] : wheezing [Arthralgias] : arthralgias [Sleep Disturbances] : sleep disturbances [Easy Bruising] : a tendency for easy bruising [Negative] : Heme/Lymph

## 2022-11-30 ENCOUNTER — APPOINTMENT (OUTPATIENT)
Dept: BREAST CENTER | Facility: CLINIC | Age: 67
End: 2022-11-30

## 2022-11-30 VITALS
WEIGHT: 244 LBS | SYSTOLIC BLOOD PRESSURE: 180 MMHG | OXYGEN SATURATION: 98 % | HEIGHT: 64 IN | DIASTOLIC BLOOD PRESSURE: 85 MMHG | HEART RATE: 64 BPM | BODY MASS INDEX: 41.66 KG/M2

## 2022-11-30 DIAGNOSIS — N60.11 DIFFUSE CYSTIC MASTOPATHY OF LEFT BREAST: ICD-10-CM

## 2022-11-30 DIAGNOSIS — N60.12 DIFFUSE CYSTIC MASTOPATHY OF LEFT BREAST: ICD-10-CM

## 2022-11-30 DIAGNOSIS — D24.1 BENIGN NEOPLASM OF RIGHT BREAST: ICD-10-CM

## 2022-11-30 PROCEDURE — 99213 OFFICE O/P EST LOW 20 MIN: CPT

## 2022-11-30 NOTE — PAST MEDICAL HISTORY
[Postmenopausal] : The patient is postmenopausal [Menarche Age ____] : age at menarche was [unfilled] [Menopause Age____] : age at menopause was [unfilled] [Total Preg ___] : G[unfilled] [Live Births ___] : P[unfilled]  [Age At Live Birth ___] : Age at live birth: [unfilled] [History of Hormone Replacement Treatment] : has no history of hormone replacement treatment [de-identified] : SANYA in 2006 due to menorrhagia

## 2022-11-30 NOTE — HISTORY OF PRESENT ILLNESS
[FreeTextEntry1] : The patient is a 67-year-old G1, P1 postmenopausal white female Urdu descent.  She underwent menarche at age 12 and had her first child at age 21.  She underwent menopause after a total abdominal hysterectomy performed in 2006 due to menorrhagia at age 51 and did take estrogen replacement for about 1 month.  She has a history of some milky yellow discharge from the right breast and underwent an ultrasound showing a questionable retroareolar density and surgical excision was performed on March 5, 2015 at [facility] by Dr. óLpez which showed an intraductal papilloma which was completely excised.  She comes in now for routine yearly breast exam and continues to get yearly mammography and ultrasound.

## 2022-11-30 NOTE — PHYSICAL EXAM
[Normocephalic] : normocephalic [Atraumatic] : atraumatic [EOMI] : extra ocular movement intact [Supple] : supple [No Supraclavicular Adenopathy] : no supraclavicular adenopathy [No Cervical Adenopathy] : no cervical adenopathy [Examined in the supine and seated position] : examined in the supine and seated position [No dominant masses] : no dominant masses in right breast  [No dominant masses] : no dominant masses left breast [No Nipple Retraction] : no left nipple retraction [No Nipple Discharge] : no left nipple discharge [Breast Mass Right Breast ___cm] : no masses [Breast Mass Left Breast ___cm] : no masses [No Axillary Lymphadenopathy] : no left axillary lymphadenopathy [No Edema] : no edema [No Rashes] : no rashes [No Ulceration] : no ulceration [Breast Nipple Inversion] : nipples not inverted [Breast Nipple Retraction] : nipples not retracted [Breast Nipple Flattening] : nipples not flattened [Breast Nipple Fissures] : nipples not fissured [Breast Abnormal Lactation (Galactorrhea)] : no galactorrhea [Breast Abnormal Secretion Bloody Fluid] : no bloody discharge [Breast Abnormal Secretion Serous Fluid] : no serous discharge [Breast Abnormal Secretion Opalescent Fluid] : no milky discharge [de-identified] : On exam, the patient has pendulous ptotic triple D-cup breasts.  On palpation, I cannot feel any suspicious densities in either breast.  She has significant obesity with back issues.  She has no axillary, supraclavicular, or cervical adenopathy.

## 2022-11-30 NOTE — ASSESSMENT
[FreeTextEntry1] : The patient is a 67-year-old G1, P1 postmenopausal white female [place] descent.  She underwent menarche at age 12 and had her first child at age 21.  She underwent menopause after a total abdominal hysterectomy performed in 2006 due to menorrhagia at age 51 and did take estrogen replacement for about 1 month.  She has a history of some milky yellow discharge from the right breast and underwent an ultrasound showing a questionable retroareolar density and surgical excision was performed on March 5, 2015 at [facility] by Dr. López which showed an intraductal papilloma which was completely excised.  The patient underwent her last bilateral mammography and ultrasound which was reviewed from November 12, 2022 performed at [facility] which showed no suspicious findings.  On exam today, I cannot feel any suspicious densities in either breast. The patient was reassured and should follow-up again in 1 year in November 2023.  She will be due for her next bilateral mammography in November 2023 and she was given prescriptions and she no longer requires ultrasounds.

## 2023-11-22 ENCOUNTER — RESULT REVIEW (OUTPATIENT)
Age: 68
End: 2023-11-22

## 2023-12-08 ENCOUNTER — NON-APPOINTMENT (OUTPATIENT)
Age: 68
End: 2023-12-08

## 2023-12-08 NOTE — REVIEW OF SYSTEMS
[Eyesight Problems] : eyesight problems [Nosebleeds] : nosebleeds [Hoarseness] : hoarseness [Shortness Of Breath] : shortness of breath [Wheezing] : wheezing [Arthralgias] : arthralgias [Sleep Disturbances] : sleep disturbances [Easy Bruising] : a tendency for easy bruising [Negative] : Endocrine

## 2023-12-12 ENCOUNTER — NON-APPOINTMENT (OUTPATIENT)
Age: 68
End: 2023-12-12

## 2023-12-16 NOTE — PAST MEDICAL HISTORY
[Postmenopausal] : The patient is postmenopausal [Menarche Age ____] : age at menarche was [unfilled] [Menopause Age____] : age at menopause was [unfilled] [History of Hormone Replacement Treatment] : has no history of hormone replacement treatment [Total Preg ___] : G[unfilled] [Live Births ___] : P[unfilled]  [Age At Live Birth ___] : Age at live birth: [unfilled] [de-identified] : SANYA in 2006 due to menorrhagia

## 2023-12-16 NOTE — ASSESSMENT
[FreeTextEntry1] : The patient is a 68-year-old G1, P1 postmenopausal white female [place] descent. She underwent menarche at age 12 and had her first child at age 21. She underwent menopause after a total abdominal hysterectomy performed in 2006 due to menorrhagia at age 51 and did take estrogen replacement for about 1 month. She has a history of some milky yellow discharge from the right breast and underwent an ultrasound showing a questionable retroareolar density and surgical excision was performed on March 5, 2015 at [facility] by Dr. López which showed an intraductal papilloma which was completely excised. The patient underwent her last bilateral mammography which was reviewed from November 22, 2023 performed at [place] which showed no suspicious findings and fatty replaced breast. On exam today, I cannot feel any suspicious densities in either breast. The patient was reassured and should follow-up again in 1 year in December 2024. She will be due for her next bilateral mammography in November 2024 and she was given prescriptions and she no longer requires ultrasounds.

## 2023-12-16 NOTE — PHYSICAL EXAM
[Normocephalic] : normocephalic [Atraumatic] : atraumatic [EOMI] : extra ocular movement intact [Supple] : supple [No Supraclavicular Adenopathy] : no supraclavicular adenopathy [No Cervical Adenopathy] : no cervical adenopathy [Examined in the supine and seated position] : examined in the supine and seated position [No dominant masses] : no dominant masses in right breast  [No dominant masses] : no dominant masses left breast [No Nipple Retraction] : no left nipple retraction [No Nipple Discharge] : no left nipple discharge [Breast Mass Right Breast ___cm] : no masses [Breast Mass Left Breast ___cm] : no masses [No Axillary Lymphadenopathy] : no left axillary lymphadenopathy [No Edema] : no edema [No Rashes] : no rashes [No Ulceration] : no ulceration [Breast Nipple Retraction] : nipples not retracted [Breast Nipple Inversion] : nipples not inverted [Breast Nipple Flattening] : nipples not flattened [Breast Nipple Fissures] : nipples not fissured [Breast Abnormal Secretion Bloody Fluid] : no bloody discharge [Breast Abnormal Lactation (Galactorrhea)] : no galactorrhea [Breast Abnormal Secretion Serous Fluid] : no serous discharge [de-identified] : On exam, the patient has pendulous ptotic triple D-cup breasts.  On palpation, I cannot feel any suspicious densities in either breast.  She has significant obesity with back issues.  She has no axillary, supraclavicular, or cervical adenopathy. [Breast Abnormal Secretion Opalescent Fluid] : no milky discharge

## 2023-12-16 NOTE — REVIEW OF SYSTEMS
[Eyesight Problems] : eyesight problems [Nosebleeds] : nosebleeds [Hoarseness] : hoarseness [Wheezing] : wheezing [Shortness Of Breath] : shortness of breath [Arthralgias] : arthralgias [Sleep Disturbances] : sleep disturbances [Easy Bruising] : a tendency for easy bruising [Negative] : Heme/Lymph

## 2023-12-16 NOTE — ASSESSMENT
[FreeTextEntry1] : The patient is a 68-year-old G1, P1 postmenopausal white female [place] descent. She underwent menarche at age 12 and had her first child at age 21. She underwent menopause after a total abdominal hysterectomy performed in 2006 due to menorrhagia at age 51 and did take estrogen replacement for about 1 month. She has a history of some milky yellow discharge from the right breast and underwent an ultrasound showing a questionable retroareolar density and surgical excision was performed on March 5, 2015 at [facility] by Dr. López which showed an intraductal papilloma which was completely excised. The patient underwent her last bilateral mammography which was reviewed from November 22, 2023 performed at [place] which showed no suspicious findings and fatty replaced breast. On exam today, I cannot feel any suspicious densities in either breast. The patient was reassured and should follow-up again in 1 year in December 2024. She will be due for her next bilateral mammography in November 2024 and she was given prescriptions and she no longer requires ultrasounds.

## 2023-12-16 NOTE — PAST MEDICAL HISTORY
[Postmenopausal] : The patient is postmenopausal [Menarche Age ____] : age at menarche was [unfilled] [Menopause Age____] : age at menopause was [unfilled] [Total Preg ___] : G[unfilled] [Live Births ___] : P[unfilled]  [Age At Live Birth ___] : Age at live birth: [unfilled] [History of Hormone Replacement Treatment] : has no history of hormone replacement treatment [de-identified] : SANYA in 2006 due to menorrhagia

## 2023-12-16 NOTE — HISTORY OF PRESENT ILLNESS
[FreeTextEntry1] : The patient is a 68-year-old G1, P1 postmenopausal white female [place] descent.  She underwent menarche at age 12 and had her first child at age 21.  She underwent menopause after a total abdominal hysterectomy performed in 2006 due to menorrhagia at age 51 and did take estrogen replacement for about 1 month.  She has a history of some milky yellow discharge from the right breast and underwent an ultrasound showing a questionable retroareolar density and surgical excision was performed on March 5, 2015 at [facility] by Dr. López which showed an intraductal papilloma which was completely excised.  She comes in now for routine yearly breast exam and continues to get yearly mammography and ultrasound.

## 2023-12-16 NOTE — HISTORY OF PRESENT ILLNESS
[FreeTextEntry1] : The patient is a 68-year-old G1, P1 postmenopausal white female [place] descent.  She underwent menarche at age 12 and had her first child at age 21.  She underwent menopause after a total abdominal hysterectomy performed in 2006 due to menorrhagia at age 51 and did take estrogen replacement for about 1 month.  She has a history of some milky yellow discharge from the right breast and underwent an ultrasound showing a questionable retroareolar density and surgical excision was performed on March 5, 2015 at [facility] by Dr. López which showed an intraductal papilloma which was completely excised.  She comes in now for routine yearly breast exam and continues to get yearly mammography and ultrasound.

## 2023-12-16 NOTE — PHYSICAL EXAM
[Normocephalic] : normocephalic [Atraumatic] : atraumatic [EOMI] : extra ocular movement intact [Supple] : supple [No Supraclavicular Adenopathy] : no supraclavicular adenopathy [No Cervical Adenopathy] : no cervical adenopathy [Examined in the supine and seated position] : examined in the supine and seated position [No dominant masses] : no dominant masses in right breast  [No dominant masses] : no dominant masses left breast [No Nipple Retraction] : no left nipple retraction [No Nipple Discharge] : no left nipple discharge [Breast Mass Right Breast ___cm] : no masses [Breast Nipple Inversion] : nipples not inverted [Breast Mass Left Breast ___cm] : no masses [Breast Nipple Retraction] : nipples not retracted [Breast Nipple Flattening] : nipples not flattened [Breast Nipple Fissures] : nipples not fissured [Breast Abnormal Lactation (Galactorrhea)] : no galactorrhea [Breast Abnormal Secretion Bloody Fluid] : no bloody discharge [Breast Abnormal Secretion Serous Fluid] : no serous discharge [Breast Abnormal Secretion Opalescent Fluid] : no milky discharge [No Axillary Lymphadenopathy] : no left axillary lymphadenopathy [No Rashes] : no rashes [No Edema] : no edema [No Ulceration] : no ulceration [de-identified] : On exam, the patient has pendulous ptotic triple D-cup breasts.  On palpation, I cannot feel any suspicious densities in either breast.  She has significant obesity with back issues.  She has no axillary, supraclavicular, or cervical adenopathy.

## 2023-12-22 ENCOUNTER — APPOINTMENT (OUTPATIENT)
Dept: BREAST CENTER | Facility: CLINIC | Age: 68
End: 2023-12-22